# Patient Record
Sex: MALE | URBAN - METROPOLITAN AREA
[De-identification: names, ages, dates, MRNs, and addresses within clinical notes are randomized per-mention and may not be internally consistent; named-entity substitution may affect disease eponyms.]

---

## 2023-11-02 ENCOUNTER — TELEPHONE (OUTPATIENT)
Dept: HEMATOLOGY ONCOLOGY | Facility: CLINIC | Age: 63
End: 2023-11-02

## 2023-11-02 NOTE — TELEPHONE ENCOUNTER
Patient Call    Who are you speaking with? Spouse    If it is not the patient, are they listed on an active communication consent form? N/A   What is the reason for this call? Savanna Singleton the patient's wife, called to schedule a new patient appointment for the patient. I informed Savanna Singleton that I could schedule an appointment for the patient but the provider is going to want to see his results from pathology before he is seen. Savanna Singleton stated she will have the records sent over and hung up. I was not able to give her the proper fax because she was frustrated with the process and disconnected. I do not have her number, I did not try to give her a call back. Does this require a call back? No   If a call back is required, please list best call back number N/A   If a call back is required, advise that a message will be forwarded to their care team and someone will return their call as soon as possible. Did you relay this information to the patient?  No

## 2023-11-03 ENCOUNTER — DOCUMENTATION (OUTPATIENT)
Dept: HEMATOLOGY ONCOLOGY | Facility: CLINIC | Age: 63
End: 2023-11-03

## 2023-11-03 NOTE — PROGRESS NOTES
Intake received, chart reviewed for need of external records. Consulting: Dr. Zeenat Contreras  Scheduled on 11-  Dx:  Melanoma of left calf      Pathology requested:   From: Isidra Allen via fax [phone],              Routed to consulting providers team.

## 2023-11-08 ENCOUNTER — TELEPHONE (OUTPATIENT)
Dept: HEMATOLOGY ONCOLOGY | Facility: CLINIC | Age: 63
End: 2023-11-08

## 2023-11-08 NOTE — TELEPHONE ENCOUNTER
Appointment Change  Cancel, Reschedule, Change to Virtual      Who are you speaking with? Spouse   If it is not the patient, is the caller listed on the communication consent form? Yes   Which provider is the appointment scheduled with? Dr. Jovanna Patton   When was the original appointment scheduled? Please list date and time 11/27/23 Jovanna Patton   At which location is the appointment scheduled to take place? [facility]   Was the appointment rescheduled? Was the appointment changed from an in person visit to a virtual visit? If so, please list the details of the change. 11/13/23 Jovanna Patton   What is the reason for the appointment change? Sooner appt       Was STAR transport scheduled? N/A   Does STAR transport need to be scheduled for the new visit (if applicable) N/A   Does the patient need an infusion appointment rescheduled? N/A   Does the patient have an upcoming infusion appointment scheduled? If so, when? No   Is the patient undergoing chemotherapy? N/A   For appointments cancelled with less than 24 hours:  Was the no-show policy reviewed?  N/A

## 2023-11-09 PROBLEM — C43.72: Status: ACTIVE | Noted: 2023-11-09

## 2023-11-10 ENCOUNTER — TELEPHONE (OUTPATIENT)
Dept: HEMATOLOGY ONCOLOGY | Facility: CLINIC | Age: 63
End: 2023-11-10

## 2023-11-10 NOTE — TELEPHONE ENCOUNTER
Called PT for upcoming 11/13 appt with Dr. Lukasz Arredondo. PT is missing information. There was no answer LVM with hopeline number to call back to finish filling info, if not we will be happy to assist once he is her for his appt.

## 2023-11-10 NOTE — TELEPHONE ENCOUNTER
Patient Call    Who are you speaking with? Spouse    If it is not the patient, are they listed on an active communication consent form? N/A   What is the reason for this call? Israel Patricia is returning the missed call she had, she is requesting to speak to the office about what information is missing. Does this require a call back? Yes   If a call back is required, please list best call back number Israel Patricia :  [phone]    If a call back is required, advise that a message will be forwarded to their care team and someone will return their call as soon as possible. Did you relay this information to the patient?  Yes

## 2023-11-13 ENCOUNTER — CONSULT (OUTPATIENT)
Dept: SURGICAL ONCOLOGY | Facility: CLINIC | Age: 63
End: 2023-11-13
Payer: COMMERCIAL

## 2023-11-13 ENCOUNTER — PATIENT OUTREACH (OUTPATIENT)
Dept: HEMATOLOGY ONCOLOGY | Facility: CLINIC | Age: 63
End: 2023-11-13

## 2023-11-13 VITALS
WEIGHT: 212.8 LBS | SYSTOLIC BLOOD PRESSURE: 150 MMHG | OXYGEN SATURATION: 98 % | TEMPERATURE: 98.4 F | HEART RATE: 64 BPM | DIASTOLIC BLOOD PRESSURE: 70 MMHG

## 2023-11-13 DIAGNOSIS — C43.72 MELANOMA OF LEFT POSTERIOR CALF (HCC): Primary | ICD-10-CM

## 2023-11-13 PROCEDURE — 99204 OFFICE O/P NEW MOD 45 MIN: CPT | Performed by: STUDENT IN AN ORGANIZED HEALTH CARE EDUCATION/TRAINING PROGRAM

## 2023-11-13 RX ORDER — CYANOCOBALAMIN (VITAMIN B-12) 500 MCG
1 TABLET ORAL DAILY
COMMUNITY

## 2023-11-13 RX ORDER — DIPHENOXYLATE HYDROCHLORIDE AND ATROPINE SULFATE 2.5; .025 MG/1; MG/1
1 TABLET ORAL DAILY
COMMUNITY

## 2023-11-13 RX ORDER — CEFAZOLIN SODIUM 1 G/50ML
1000 SOLUTION INTRAVENOUS ONCE
OUTPATIENT
Start: 2023-11-13 | End: 2023-11-13

## 2023-11-13 RX ORDER — SILDENAFIL 100 MG/1
TABLET, FILM COATED ORAL
COMMUNITY
Start: 2023-10-21

## 2023-11-13 NOTE — PROGRESS NOTES
I called to check on slides request. [facility] had not sent slides out. They stated they had not received request. I resent request and confirmed receiptt. The pathology slides will be overnighted.

## 2023-11-13 NOTE — PROGRESS NOTES
Surgical Oncology Consultation    [address] [facility] [address]  [phone]    Patient:  Refugio Rivera  1960  [id number]    Primary Care provider:  No primary care provider on file. No primary provider on file. Referring provider:  Referral Self  No address on file    Diagnoses and all orders for this visit:    Melanoma of left posterior calf ([address])    Other orders  -     NON FORMULARY; Take 1 tablet by mouth daily  -     multivitamin (THERAGRAN) TABS; Take 1 tablet by mouth daily  -     sildenafil (VIAGRA) 100 mg tablet; TAKE ONE TABLET BY MOUTH ONCE A DAY AS NEEDED  -     GARLIC PO; Take 1 tablet by mouth daily  -     Omega-3 Fatty Acids (Fish Oil) 300 MG CAPS; Take 1 tablet by mouth daily        Chief Complaint   Patient presents with    Consult       No follow-ups on file. Oncology History   Melanoma of left posterior calf ([address])   10/19/2023 Biopsy    Left upper lateral calf- Malignant Melanoma  Thickness 0.5mm  Less than 1 Mitoses   Ulceration absent      11/9/2023 Initial Diagnosis    Melanoma of left posterior calf (HCC)         History of Present Illness  : This is a 43-year-old male who presents in consultation for a thin melanoma. Briefly, the patient sees dermatology regularly due to his history of having fair skin and red hair with numerous nevi. He has no history of melanoma or nonmelanoma to skin cancer. On his most recent visit with dermatology, a lesion was noted on the left posterior calf. The patient states he had not noticed any abnormality of this lesion or any changes over the past several months or years. A shave biopsy was obtained, revealing a T1 a melanoma with negative deep margin. Patient denies any other associated lumps or bumps of the left lower extremity. No lymphadenopathy. No systemic symptoms of bone pain, weight loss, headache, dizziness, other.     Review of Systems  Complete ROS Surg Onc:   Constitutional: The patient denies new or recent history of general fatigue, no recent weight loss, no change in appetite. Eyes: No complaints of visual problems, no scleral icterus. ENT: No complaints of ear pain, no hoarseness, no difficulty swallowing,  no tinnitus and no new masses in head, oral cavity, or neck. Cardiovascular: No complaints of chest pain, no palpitations, no ankle edema. Respiratory: No complaints of shortness of breath, no cough. Gastrointestinal: No complaints of jaundice, no bloody stools, no pale stools. Genitourinary: No complaints of dysuria, no hematuria, no nocturia, no frequent urination, no urethral discharge. Musculoskeletal: No complaints of weakness, paralysis, joint stiffness or arthralgias. Integumentary: No complaints of rash, no new lesions. Neurological: No complaints of convulsions, no seizures, no dizziness. Hematologic/Lymphatic: No complaints of easy bruising. Endocrine:  No hot or cold intolerance. No polydipsia, polyphagia, or polyuria. Allergy/immunology:  No environmental allergies. No food allergies. Not immunocompromised. Patient Active Problem List   Diagnosis    Melanoma of left posterior calf ([address])     No past medical history on file. No past surgical history on file. No family history on file.   Social History     Socioeconomic History    Marital status: /Civil Union     Spouse name: Not on file    Number of children: Not on file    Years of education: Not on file    Highest education level: Not on file   Occupational History    Not on file   Tobacco Use    Smoking status: Never    Smokeless tobacco: Never   Substance and Sexual Activity    Alcohol use: Not on file    Drug use: Not on file    Sexual activity: Not on file   Other Topics Concern    Not on file   Social History Narrative    Not on file     Social Determinants of Health     Financial Resource Strain: Not on file   Food Insecurity: Not on file   Transportation Needs: Not on file   Physical Activity: Not on file   Stress: Not on file   Social Connections: Not on file   Intimate Partner Violence: Not on file   Housing Stability: Not on file       Current Outpatient Medications:     GARLIC PO, Take 1 tablet by mouth daily, Disp: , Rfl:     multivitamin (THERAGRAN) TABS, Take 1 tablet by mouth daily, Disp: , Rfl:     NON FORMULARY, Take 1 tablet by mouth daily, Disp: , Rfl:     Omega-3 Fatty Acids (Fish Oil) 300 MG CAPS, Take 1 tablet by mouth daily, Disp: , Rfl:     sildenafil (VIAGRA) 100 mg tablet, TAKE ONE TABLET BY MOUTH ONCE A DAY AS NEEDED, Disp: , Rfl:   Allergies   Allergen Reactions    Penicillins Other (See Comments)     unknown    REACTION IS UNKNOWN - OCCURRED IN CHILDHOOD       Vitals:    11/13/23 1452   BP: 150/70   Pulse: 64   Temp: 98.4 °F (36.9 °C)   SpO2: 98%       Physical Exam   General: Appears well, appears stated age  Skin: Warm, anicteric. LFT posterior calf with biopsy site. HEENT: Normocephalic, atraumatic; sclera aniceteric, mucous membranes moist; cervical nodes without adenopathy  Cardiopulmonary: RRR, Easy WOB, no BLE edema  Abd: Flat and soft, nontender, no masses appreciated, no hepatosplenomegaly  MSK: Symmetric, no cyanosis, no overt weakness  Lymphatic: No cervical, axillary or inguinal lymphadenopathy  Neuro: Affect appropriate, no gross motor abnormalities      Pathology:  Per media    Labs: Reviewed in EPIC    Imaging  No results found. I independently reviewed and interpreted the above laboratory and imaging data. Discussion/Summary:   57 yo male with new diagnosis of T1a melanoma of left posterior calf. Discussed diagnosis of melanoma and explained that the patient has a thin melanoma pending our eval of path, for which a wide local excision is recommended. Discussed that any additional therapy will be guided by the final pathology.  Discussed that sun protection is best prevention for melanoma and discussed ongoing sun protection. Reinforced need for continued skin surveillance with dermatology, which the patient has been very diligent about. Risks, benefits, alternatives and prognosis discussed in full to include bleeding, infection, wound healing complications, need for re-excision, seroma, and pt expresses understanding and endorsement. Will need plastics closure with likely graft given location of the lesion. Will proceed with WLE.

## 2023-11-15 ENCOUNTER — DOCUMENTATION (OUTPATIENT)
Dept: HEMATOLOGY ONCOLOGY | Facility: CLINIC | Age: 63
End: 2023-11-15

## 2023-11-15 NOTE — PROGRESS NOTES
Confirmed outside  pathology slides received from [facility].     Note routed to consulting team  Dr. Bishop Rasmussen  Scheduled on 11-  Dx -Melanoma of left calf

## 2023-11-16 ENCOUNTER — LAB REQUISITION (OUTPATIENT)
Dept: LAB | Facility: HOSPITAL | Age: 63
End: 2023-11-16
Payer: COMMERCIAL

## 2023-11-16 DIAGNOSIS — L81.4 OTHER MELANIN HYPERPIGMENTATION: ICD-10-CM

## 2023-11-16 DIAGNOSIS — C43.72 MALIGNANT MELANOMA OF LEFT LOWER LIMB, INCLUDING HIP (HCC): ICD-10-CM

## 2023-11-16 PROCEDURE — 88321 CONSLTJ&REPRT SLD PREP ELSWR: CPT | Performed by: STUDENT IN AN ORGANIZED HEALTH CARE EDUCATION/TRAINING PROGRAM

## 2023-12-01 ENCOUNTER — TELEPHONE (OUTPATIENT)
Dept: HEMATOLOGY ONCOLOGY | Facility: CLINIC | Age: 63
End: 2023-12-01

## 2023-12-01 NOTE — TELEPHONE ENCOUNTER
Patient Call    Who are you speaking with? Patient    If it is not the patient, are they listed on an active communication consent form? N/A   What is the reason for this call? Patient's wife is asking after the pre op work he needs to do. She states the paperwork says 2 weeks prior to surgery, but he can't not get it done until 12/4/23   Does this require a call back? Yes   If a call back is required, please list best call back number [phone]   If a call back is required, advise that a message will be forwarded to their care team and someone will return their call as soon as possible. Did you relay this information to the patient?  Yes

## 2023-12-01 NOTE — TELEPHONE ENCOUNTER
Spoke with pts wife. Discussed that as long as he is able to get his PATs done on that day, it will be fine. She verbalized understanding and was appreciative of the call.

## 2023-12-04 ENCOUNTER — HOSPITAL ENCOUNTER (OUTPATIENT)
Dept: RADIOLOGY | Facility: HOSPITAL | Age: 63
Discharge: HOME | End: 2023-12-04
Payer: COMMERCIAL

## 2023-12-04 ENCOUNTER — CONSULT (OUTPATIENT)
Dept: PLASTIC SURGERY | Facility: CLINIC | Age: 63
End: 2023-12-04
Payer: COMMERCIAL

## 2023-12-04 ENCOUNTER — APPOINTMENT (OUTPATIENT)
Dept: LAB | Facility: HOSPITAL | Age: 63
End: 2023-12-04
Payer: COMMERCIAL

## 2023-12-04 ENCOUNTER — TELEPHONE (OUTPATIENT)
Dept: PLASTIC SURGERY | Facility: CLINIC | Age: 63
End: 2023-12-04

## 2023-12-04 ENCOUNTER — OFFICE VISIT (OUTPATIENT)
Dept: LAB | Facility: HOSPITAL | Age: 63
End: 2023-12-04
Payer: COMMERCIAL

## 2023-12-04 VITALS
HEIGHT: 71 IN | DIASTOLIC BLOOD PRESSURE: 74 MMHG | HEART RATE: 79 BPM | SYSTOLIC BLOOD PRESSURE: 133 MMHG | BODY MASS INDEX: 30.1 KG/M2 | WEIGHT: 215 LBS | TEMPERATURE: 97.3 F

## 2023-12-04 DIAGNOSIS — C43.72 MELANOMA OF LEFT POSTERIOR CALF (HCC): ICD-10-CM

## 2023-12-04 LAB
ALBUMIN SERPL BCP-MCNC: 4.2 G/DL (ref 3.5–5)
ALP SERPL-CCNC: 80 U/L (ref 34–104)
ALT SERPL W P-5'-P-CCNC: 38 U/L (ref 7–52)
ANION GAP SERPL CALCULATED.3IONS-SCNC: 5 MMOL/L
AST SERPL W P-5'-P-CCNC: 53 U/L (ref 13–39)
BASOPHILS # BLD AUTO: 0.04 THOUSANDS/ÂΜL (ref 0–0.1)
BASOPHILS NFR BLD AUTO: 1 % (ref 0–1)
BILIRUB SERPL-MCNC: 0.46 MG/DL (ref 0.2–1)
BUN SERPL-MCNC: 21 MG/DL (ref 5–25)
CALCIUM SERPL-MCNC: 9.5 MG/DL (ref 8.4–10.2)
CHLORIDE SERPL-SCNC: 102 MMOL/L (ref 96–108)
CO2 SERPL-SCNC: 32 MMOL/L (ref 21–32)
CREAT SERPL-MCNC: 1.02 MG/DL (ref 0.6–1.3)
EOSINOPHIL # BLD AUTO: 0.36 THOUSAND/ÂΜL (ref 0–0.61)
EOSINOPHIL NFR BLD AUTO: 5 % (ref 0–6)
ERYTHROCYTE [DISTWIDTH] IN BLOOD BY AUTOMATED COUNT: 12.1 % (ref 11.6–15.1)
GFR SERPL CREATININE-BSD FRML MDRD: 77 ML/MIN/1.73SQ M
GLUCOSE SERPL-MCNC: 90 MG/DL (ref 65–140)
HCT VFR BLD AUTO: 41.7 % (ref 36.5–49.3)
HGB BLD-MCNC: 13.9 G/DL (ref 12–17)
IMM GRANULOCYTES # BLD AUTO: 0.02 THOUSAND/UL (ref 0–0.2)
IMM GRANULOCYTES NFR BLD AUTO: 0 % (ref 0–2)
LYMPHOCYTES # BLD AUTO: 2.29 THOUSANDS/ÂΜL (ref 0.6–4.47)
LYMPHOCYTES NFR BLD AUTO: 31 % (ref 14–44)
MCH RBC QN AUTO: 31.4 PG (ref 26.8–34.3)
MCHC RBC AUTO-ENTMCNC: 33.3 G/DL (ref 31.4–37.4)
MCV RBC AUTO: 94 FL (ref 82–98)
MONOCYTES # BLD AUTO: 0.8 THOUSAND/ÂΜL (ref 0.17–1.22)
MONOCYTES NFR BLD AUTO: 11 % (ref 4–12)
NEUTROPHILS # BLD AUTO: 3.88 THOUSANDS/ÂΜL (ref 1.85–7.62)
NEUTS SEG NFR BLD AUTO: 52 % (ref 43–75)
NRBC BLD AUTO-RTO: 0 /100 WBCS
PLATELET # BLD AUTO: 265 THOUSANDS/UL (ref 149–390)
PMV BLD AUTO: 9.3 FL (ref 8.9–12.7)
POTASSIUM SERPL-SCNC: 4 MMOL/L (ref 3.5–5.3)
PROT SERPL-MCNC: 7.5 G/DL (ref 6.4–8.4)
RBC # BLD AUTO: 4.42 MILLION/UL (ref 3.88–5.62)
SODIUM SERPL-SCNC: 139 MMOL/L (ref 135–147)
WBC # BLD AUTO: 7.39 THOUSAND/UL (ref 4.31–10.16)

## 2023-12-04 PROCEDURE — 93005 ELECTROCARDIOGRAM TRACING: CPT

## 2023-12-04 PROCEDURE — 99203 OFFICE O/P NEW LOW 30 MIN: CPT | Performed by: STUDENT IN AN ORGANIZED HEALTH CARE EDUCATION/TRAINING PROGRAM

## 2023-12-04 PROCEDURE — 36415 COLL VENOUS BLD VENIPUNCTURE: CPT

## 2023-12-04 PROCEDURE — 71046 X-RAY EXAM CHEST 2 VIEWS: CPT

## 2023-12-04 PROCEDURE — 85025 COMPLETE CBC W/AUTO DIFF WBC: CPT

## 2023-12-04 PROCEDURE — 80053 COMPREHEN METABOLIC PANEL: CPT

## 2023-12-04 NOTE — H&P (VIEW-ONLY)
Plastic Surgery Consult    Reason for visit: biopsy proven melanoma of the left posterior calf, presents for reconstructive options    HPI form 12/4/23  Patient is a 57 y/o male who presents with biopsy proven melanoma of the left posterior calf. He is scheduled for wide-local excision of the lesion with Dr Stanton Mcarthur. He presents for discussion of reconstructive options. This is patient's first melanoma. He has never had any skin cancer in the past.    ROS: 12 pt ROS negative, except as otherwise noted in HPI    PMH: None  FamHx: non-contrib  SurgHx: left knee replacement, left inguinal hernia replacement  SocHx: no tobacco, no ETOH  Meds: no steroids, no blood thinners  Allergies: PCN    PE:    Vitals:    12/04/23 1130   BP: 133/74   Pulse: 79   Temp: (!) 97.3 °F (36.3 °C)       General: NC/AT, breathing comfortably on RA  Neuro: CN II-XII grossly intact, symmetric reflexes  HEENT: PERRLA, EOMI, external ears normal, no lesions or deformities, neck supple, trachea midline  Respiratory: CTAB, normal respiratory effort  Cardio: RRR, normal S1, S2, no murmur, rubs, gallops  GI: soft, non-tender, non-distended  Extremities/MSK: normal alignment, mobility, gait, no edema  Skin: no rashes, lesions, subcutaneous nodules    BMI 30    Left posterior calf with mildly erythematous amorphous nevus ~2.5x1.5 cm    Path: reviewed. A/P: 57 y/o male with left posterior calf melanoma scheduled for WLE with Dr Stanton Mcarthur.  -Discussed reconstructive options including complex closure, local flap, possible STSG with VAC placement. Discussed each of these options in detail, will most likely be STSG with VAC placement bolster due to size, but ultimate technique will depend on size of wound.  -Discussed left thigh as donor site  -Discussed risks, procedure, complications, including but not limited to infection, bleeding, scarring, need for further surgery, contour deformity, partial vs complete graft loss.  Patient acknowledged.  -All questions answered, concerns addressed. -consent obtained, will coordinate with Dr Nicolas Maxwell  -Spent 40 minutes in consultation with patient.  Greater than 50% of the total time was spent obtaining history, evaluation, performing exam, discussion of management options including post-operative care, answering patient's questions and concerns, chart reviewing, and documentation      Jeanna Samano MD   [address] [place] [address] and Reconstructive Surgery   [facility], [address] [place] [address] Stephanie Redd   Office: [phone]

## 2023-12-04 NOTE — PROGRESS NOTES
Plastic Surgery Consult    Reason for visit: biopsy proven melanoma of the left posterior calf, presents for reconstructive options    HPI form 12/4/23  Patient is a 57 y/o male who presents with biopsy proven melanoma of the left posterior calf. He is scheduled for wide-local excision of the lesion with Dr Vin Taylor. He presents for discussion of reconstructive options. This is patient's first melanoma. He has never had any skin cancer in the past.    ROS: 12 pt ROS negative, except as otherwise noted in HPI    PMH: None  FamHx: non-contrib  SurgHx: left knee replacement, left inguinal hernia replacement  SocHx: no tobacco, no ETOH  Meds: no steroids, no blood thinners  Allergies: PCN    PE:    Vitals:    12/04/23 1130   BP: 133/74   Pulse: 79   Temp: (!) 97.3 °F (36.3 °C)       General: NC/AT, breathing comfortably on RA  Neuro: CN II-XII grossly intact, symmetric reflexes  HEENT: PERRLA, EOMI, external ears normal, no lesions or deformities, neck supple, trachea midline  Respiratory: CTAB, normal respiratory effort  Cardio: RRR, normal S1, S2, no murmur, rubs, gallops  GI: soft, non-tender, non-distended  Extremities/MSK: normal alignment, mobility, gait, no edema  Skin: no rashes, lesions, subcutaneous nodules    BMI 30    Left posterior calf with mildly erythematous amorphous nevus ~2.5x1.5 cm    Path: reviewed. A/P: 57 y/o male with left posterior calf melanoma scheduled for WLE with Dr Vin Taylor.  -Discussed reconstructive options including complex closure, local flap, possible STSG with VAC placement. Discussed each of these options in detail, will most likely be STSG with VAC placement bolster due to size, but ultimate technique will depend on size of wound.  -Discussed left thigh as donor site  -Discussed risks, procedure, complications, including but not limited to infection, bleeding, scarring, need for further surgery, contour deformity, partial vs complete graft loss.  Patient acknowledged.  -All questions answered, concerns addressed. -consent obtained, will coordinate with Dr Violette Amado  -Spent 40 minutes in consultation with patient.  Greater than 50% of the total time was spent obtaining history, evaluation, performing exam, discussion of management options including post-operative care, answering patient's questions and concerns, chart reviewing, and documentation      Nav Vuong MD   [address] [facility] [address] and Reconstructive Surgery   [facility], [facility] [address] [place] Stephanie Redd   Office: [phone]

## 2023-12-06 ENCOUNTER — ANESTHESIA EVENT (OUTPATIENT)
Dept: PERIOP | Facility: HOSPITAL | Age: 63
End: 2023-12-06
Payer: COMMERCIAL

## 2023-12-06 ENCOUNTER — TELEPHONE (OUTPATIENT)
Age: 63
End: 2023-12-06

## 2023-12-06 LAB
ATRIAL RATE: 66 BPM
P AXIS: 31 DEGREES
PR INTERVAL: 148 MS
QRS AXIS: 69 DEGREES
QRSD INTERVAL: 86 MS
QT INTERVAL: 402 MS
QTC INTERVAL: 421 MS
T WAVE AXIS: 57 DEGREES
VENTRICULAR RATE: 66 BPM

## 2023-12-06 NOTE — TELEPHONE ENCOUNTER
Wife called to get fax number to office to send short term disability forms filled out. Patient will have surgery next week.     Office fax was given [phone]

## 2023-12-06 NOTE — PRE-PROCEDURE INSTRUCTIONS
Pre-Surgery InstrMedication instructions for day surgery reviewed. Please use only a sip of water to take your instructed medications. Avoid all over the counter vitamins, supplements and NSAIDS for one week prior to surgery per anesthesia guidelines. Tylenol is ok to take as needed. You will receive a call one business day prior to surgery with an arrival time and hospital directions. If your surgery is scheduled on a Monday, the hospital will be calling you on the Friday prior to your surgery. If you have not heard from anyone by 8pm, please call the hospital supervisor through the hospital  at [phone]. Travis Martinez [phone]). Do not eat or drink anything after midnight the night before your surgery, including candy, mints, lifesavers, or chewing gum. Do not drink alcohol 24hrs before your surgery. Try not to smoke at least 24hrs before your surgery. Follow the pre surgery showering instructions as listed in the [facility] Surgical Experience Booklet” or otherwise provided by your surgeon's office. Do not use a blade to shave the surgical area 1 week before surgery. It is okay to use a clean electric clippers up to 24 hours before surgery. Do not apply any lotions, creams, including makeup, cologne, deodorant, or perfumes after showering on the day of your surgery. Do not use dry shampoo, hair spray, hair gel, or any type of hair products. No contact lenses, eye make-up, or artificial eyelashes. Remove nail polish, including gel polish, and any artificial, gel, or acrylic nails if possible. Remove all jewelry including rings and body piercing jewelry. Wear causal clothing that is easy to take on and off. Consider your type of surgery. Keep any valuables, jewelry, piercings at home. Please bring any specially ordered equipment (sling, braces) if indicated. Arrange for a responsible person to drive you to and from the hospital on the day of your surgery.  Visitor Guidelines discussed. Call the surgeon's office with any new illnesses, exposures, or additional questions prior to surgery. Please reference your [facility] Surgical Experience Booklet” for additional information to prepare for your upcoming surgery. uctions:   Medication Instructions    Ascorbic Acid (VITAMIN C PO) Stop taking 7 days prior to surgery. GARLIC PO Stop taking 7 days prior to surgery. multivitamin (THERAGRAN) TABS Stop taking 7 days prior to surgery. NON FORMULARY Stop taking 7 days prior to surgery. Omega-3 Fatty Acids (SALMON OIL PO) Stop taking 7 days prior to surgery. sildenafil (VIAGRA) 100 mg tablet Hold day of surgery.

## 2023-12-07 NOTE — TELEPHONE ENCOUNTER
Patient called again to see if we have his paperwork , she just faxed over 2 minutes ago .  I advised her to call tomorrow to confirm that we received it

## 2023-12-08 ENCOUNTER — TELEPHONE (OUTPATIENT)
Dept: HEMATOLOGY ONCOLOGY | Facility: CLINIC | Age: 63
End: 2023-12-08

## 2023-12-08 NOTE — TELEPHONE ENCOUNTER
Patient Call    Who are you speaking with? Spouse    If it is not the patient, are they listed on an active communication consent form? Yes   What is the reason for this call? Patients wife is wondering if we received the fax for short term disability paperwork   Does this require a call back? Yes   If a call back is required, please list best call back number [phone]   If a call back is required, advise that a message will be forwarded to their care team and someone will return their call as soon as possible. Did you relay this information to the patient?  Yes

## 2023-12-08 NOTE — TELEPHONE ENCOUNTER
Patient's wife called asking if we received patient's fmla form . She faxed over last night . I informed her that I do not see anything under media and she needs to resend .

## 2023-12-11 NOTE — TELEPHONE ENCOUNTER
Spoke with pts wife and informed her fax was received for pts STD. Will complete forms and have Dr Monet Avery sign off on them.

## 2023-12-12 ENCOUNTER — TELEPHONE (OUTPATIENT)
Dept: HEMATOLOGY ONCOLOGY | Facility: CLINIC | Age: 63
End: 2023-12-12

## 2023-12-12 NOTE — TELEPHONE ENCOUNTER
Patient Call    Who are you speaking with? Spouse    If it is not the patient, are they listed on an active communication consent form? Yes   What is the reason for this call? Jen De Anda calling in regards to patient's surgery with Dr. Nicolette Su scheduled for tomorrow. Jen De Anda states they did not receive a call for when patient should arrive at the hospital.  Jen De Anda would like a call back in regards to patient's surgery. Does this require a call back? Yes   If a call back is required, please list best call back number [phone]   If a call back is required, advise that a message will be forwarded to their care team and someone will return their call as soon as possible. Did you relay this information to the patient?  Yes

## 2023-12-12 NOTE — TELEPHONE ENCOUNTER
Spoke with wife and let her know they will receive the call sometime between now and 6pm with an arrival time for surgery tomorrow.

## 2023-12-13 ENCOUNTER — HOSPITAL ENCOUNTER (OUTPATIENT)
Facility: HOSPITAL | Age: 63
Setting detail: OUTPATIENT SURGERY
Discharge: HOME | End: 2023-12-13
Attending: STUDENT IN AN ORGANIZED HEALTH CARE EDUCATION/TRAINING PROGRAM | Admitting: STUDENT IN AN ORGANIZED HEALTH CARE EDUCATION/TRAINING PROGRAM
Payer: COMMERCIAL

## 2023-12-13 ENCOUNTER — ANESTHESIA (OUTPATIENT)
Dept: PERIOP | Facility: HOSPITAL | Age: 63
End: 2023-12-13
Payer: COMMERCIAL

## 2023-12-13 VITALS
WEIGHT: 204.6 LBS | HEIGHT: 71 IN | RESPIRATION RATE: 22 BRPM | TEMPERATURE: 98.3 F | HEART RATE: 66 BPM | OXYGEN SATURATION: 96 % | SYSTOLIC BLOOD PRESSURE: 161 MMHG | BODY MASS INDEX: 28.64 KG/M2 | DIASTOLIC BLOOD PRESSURE: 90 MMHG

## 2023-12-13 DIAGNOSIS — C43.72 MELANOMA OF LEFT POSTERIOR CALF (HCC): Primary | ICD-10-CM

## 2023-12-13 PROCEDURE — 14302 TIS TRNFR ADDL 30 SQ CM: CPT | Performed by: STUDENT IN AN ORGANIZED HEALTH CARE EDUCATION/TRAINING PROGRAM

## 2023-12-13 PROCEDURE — 11603 EXC TR-EXT MAL+MARG 2.1-3 CM: CPT | Performed by: STUDENT IN AN ORGANIZED HEALTH CARE EDUCATION/TRAINING PROGRAM

## 2023-12-13 PROCEDURE — 88305 TISSUE EXAM BY PATHOLOGIST: CPT | Performed by: STUDENT IN AN ORGANIZED HEALTH CARE EDUCATION/TRAINING PROGRAM

## 2023-12-13 PROCEDURE — 14301 TIS TRNFR ANY 30.1-60 SQ CM: CPT | Performed by: STUDENT IN AN ORGANIZED HEALTH CARE EDUCATION/TRAINING PROGRAM

## 2023-12-13 PROCEDURE — 11606 EXC TR-EXT MAL+MARG >4 CM: CPT | Performed by: PHYSICIAN ASSISTANT

## 2023-12-13 RX ORDER — PROPOFOL 10 MG/ML
INJECTION, EMULSION INTRAVENOUS AS NEEDED
Status: DISCONTINUED | OUTPATIENT
Start: 2023-12-13 | End: 2023-12-13

## 2023-12-13 RX ORDER — GABAPENTIN 300 MG/1
300 CAPSULE ORAL ONCE
Status: COMPLETED | OUTPATIENT
Start: 2023-12-13 | End: 2023-12-13

## 2023-12-13 RX ORDER — OXYCODONE HYDROCHLORIDE 5 MG/1
5 TABLET ORAL EVERY 4 HOURS PRN
Status: DISCONTINUED | OUTPATIENT
Start: 2023-12-13 | End: 2023-12-13 | Stop reason: HOSPADM

## 2023-12-13 RX ORDER — ACETAMINOPHEN AND CODEINE PHOSPHATE 300; 30 MG/1; MG/1
1 TABLET ORAL EVERY 4 HOURS PRN
Qty: 18 TABLET | Refills: 0 | Status: SHIPPED | OUTPATIENT
Start: 2023-12-13

## 2023-12-13 RX ORDER — FENTANYL CITRATE/PF 50 MCG/ML
25 SYRINGE (ML) INJECTION
Status: DISCONTINUED | OUTPATIENT
Start: 2023-12-13 | End: 2023-12-13 | Stop reason: HOSPADM

## 2023-12-13 RX ORDER — KETOROLAC TROMETHAMINE 30 MG/ML
INJECTION, SOLUTION INTRAMUSCULAR; INTRAVENOUS AS NEEDED
Status: DISCONTINUED | OUTPATIENT
Start: 2023-12-13 | End: 2023-12-13

## 2023-12-13 RX ORDER — FENTANYL CITRATE 50 UG/ML
INJECTION, SOLUTION INTRAMUSCULAR; INTRAVENOUS AS NEEDED
Status: DISCONTINUED | OUTPATIENT
Start: 2023-12-13 | End: 2023-12-13

## 2023-12-13 RX ORDER — ONDANSETRON 2 MG/ML
INJECTION INTRAMUSCULAR; INTRAVENOUS AS NEEDED
Status: DISCONTINUED | OUTPATIENT
Start: 2023-12-13 | End: 2023-12-13

## 2023-12-13 RX ORDER — HYDROMORPHONE HCL/PF 1 MG/ML
0.25 SYRINGE (ML) INJECTION
Status: DISCONTINUED | OUTPATIENT
Start: 2023-12-13 | End: 2023-12-13 | Stop reason: HOSPADM

## 2023-12-13 RX ORDER — LIDOCAINE HYDROCHLORIDE 20 MG/ML
INJECTION, SOLUTION EPIDURAL; INFILTRATION; INTRACAUDAL; PERINEURAL AS NEEDED
Status: DISCONTINUED | OUTPATIENT
Start: 2023-12-13 | End: 2023-12-13

## 2023-12-13 RX ORDER — ONDANSETRON 2 MG/ML
4 INJECTION INTRAMUSCULAR; INTRAVENOUS ONCE AS NEEDED
Status: DISCONTINUED | OUTPATIENT
Start: 2023-12-13 | End: 2023-12-13 | Stop reason: HOSPADM

## 2023-12-13 RX ORDER — DEXAMETHASONE SODIUM PHOSPHATE 10 MG/ML
INJECTION, SOLUTION INTRAMUSCULAR; INTRAVENOUS AS NEEDED
Status: DISCONTINUED | OUTPATIENT
Start: 2023-12-13 | End: 2023-12-13

## 2023-12-13 RX ORDER — ACETAMINOPHEN 325 MG/1
975 TABLET ORAL ONCE
Status: COMPLETED | OUTPATIENT
Start: 2023-12-13 | End: 2023-12-13

## 2023-12-13 RX ORDER — SODIUM CHLORIDE, SODIUM LACTATE, POTASSIUM CHLORIDE, CALCIUM CHLORIDE 600; 310; 30; 20 MG/100ML; MG/100ML; MG/100ML; MG/100ML
INJECTION, SOLUTION INTRAVENOUS CONTINUOUS PRN
Status: DISCONTINUED | OUTPATIENT
Start: 2023-12-13 | End: 2023-12-13

## 2023-12-13 RX ORDER — MIDAZOLAM HYDROCHLORIDE 2 MG/2ML
INJECTION, SOLUTION INTRAMUSCULAR; INTRAVENOUS AS NEEDED
Status: DISCONTINUED | OUTPATIENT
Start: 2023-12-13 | End: 2023-12-13

## 2023-12-13 RX ORDER — CEFAZOLIN SODIUM 1 G/50ML
2000 SOLUTION INTRAVENOUS ONCE
Status: COMPLETED | OUTPATIENT
Start: 2023-12-13 | End: 2023-12-13

## 2023-12-13 RX ORDER — SODIUM CHLORIDE, SODIUM LACTATE, POTASSIUM CHLORIDE, CALCIUM CHLORIDE 600; 310; 30; 20 MG/100ML; MG/100ML; MG/100ML; MG/100ML
125 INJECTION, SOLUTION INTRAVENOUS CONTINUOUS
Status: DISCONTINUED | OUTPATIENT
Start: 2023-12-13 | End: 2023-12-13 | Stop reason: HOSPADM

## 2023-12-13 RX ORDER — LIDOCAINE HYDROCHLORIDE AND EPINEPHRINE 10; 10 MG/ML; UG/ML
INJECTION, SOLUTION INFILTRATION; PERINEURAL AS NEEDED
Status: DISCONTINUED | OUTPATIENT
Start: 2023-12-13 | End: 2023-12-13 | Stop reason: HOSPADM

## 2023-12-13 RX ADMIN — FENTANYL CITRATE 50 MCG: 50 INJECTION, SOLUTION INTRAMUSCULAR; INTRAVENOUS at 14:56

## 2023-12-13 RX ADMIN — ONDANSETRON 4 MG: 2 INJECTION INTRAMUSCULAR; INTRAVENOUS at 14:34

## 2023-12-13 RX ADMIN — CEFAZOLIN SODIUM 2000 MG: 1 SOLUTION INTRAVENOUS at 14:41

## 2023-12-13 RX ADMIN — SODIUM CHLORIDE, SODIUM LACTATE, POTASSIUM CHLORIDE, AND CALCIUM CHLORIDE: .6; .31; .03; .02 INJECTION, SOLUTION INTRAVENOUS at 14:29

## 2023-12-13 RX ADMIN — ACETAMINOPHEN 975 MG: 325 TABLET, FILM COATED ORAL at 12:45

## 2023-12-13 RX ADMIN — DEXAMETHASONE SODIUM PHOSPHATE 10 MG: 10 INJECTION, SOLUTION INTRAMUSCULAR; INTRAVENOUS at 14:34

## 2023-12-13 RX ADMIN — KETOROLAC TROMETHAMINE 15 MG: 30 INJECTION, SOLUTION INTRAMUSCULAR; INTRAVENOUS at 15:18

## 2023-12-13 RX ADMIN — PROPOFOL 200 MG: 10 INJECTION, EMULSION INTRAVENOUS at 14:34

## 2023-12-13 RX ADMIN — LIDOCAINE HYDROCHLORIDE 100 MG: 20 INJECTION, SOLUTION EPIDURAL; INFILTRATION; INTRACAUDAL; PERINEURAL at 14:34

## 2023-12-13 RX ADMIN — MIDAZOLAM 2 MG: 1 INJECTION INTRAMUSCULAR; INTRAVENOUS at 14:27

## 2023-12-13 RX ADMIN — FENTANYL CITRATE 50 MCG: 50 INJECTION, SOLUTION INTRAMUSCULAR; INTRAVENOUS at 14:47

## 2023-12-13 RX ADMIN — GABAPENTIN 300 MG: 300 CAPSULE ORAL at 12:45

## 2023-12-13 NOTE — OP NOTE
OPERATIVE REPORT  PATIENT NAME: Mani Barnes    :  1960  MRN: [id number]  Pt Location: [facility] OR ROOM 01    SURGERY DATE: 2023    Surgeons and Role:  Panel 1:     * Emil Mccauley MD - Primary     * Jazmin Sylvester - Assisting  Panel 2:     * Jin Penaloza MD - Primary    Preop Diagnosis:  Melanoma of left posterior calf ([address]) [C43.72]    Post-Op Diagnosis Codes:     * Melanoma of left posterior calf ([address]) [C43.72]    Procedure(s):  Local advancement flaps, adjacent tissue transfer for closure of left lower posterior calf defect (12x8 cm, 96 cm2)    Specimen(s):  ID Type Source Tests Collected by Time Destination   1 : Melanoma left calf -- suture marks 1200 Tissue Leg, Left TISSUE EXAM Emil Mccauley MD 2023  2:53 PM        Estimated Blood Loss:   Minimal    Drains:  * No LDAs found *    Anesthesia Type:   General    Operative Indications:  Melanoma of left posterior calf ([address]) [C43.72]    Operative Findings:  Left posterior calf defect (4x3 cm)  Bilateral local advancement flaps (12x8 cm, total 96 cm2)    Complications:   None    Procedure and Technique:  Patient was already intubated in the operating room as Dr Stanton Mcarthur had performed wide local excision of left calf melanoma. A timeout was performed for my portion of the case at which point all patient identifiers were deemed to be correct. The left lower extremity was prepped and draped in the normal sterile fashion. I first began by examining the defect which was 4x3 cm in greatest dimension down to the muscular fascia. There was moderate laxity in the surrounding tissue and so I decided to perform bilateral advancement flaps rather than skin graft. The bilateral advancement flaps were marked including the standing cutaneous deformities. The standing cutaneous deformities were excised. The bilateral advancement flaps were dissected and raised until they could be advanced to closure on minimal tension.  After adequate dissection was performed, the bilateral advancement flaps were reapproximated and dermis and subcutaneous tissue was closed with 2-0 vicryl and skin with 3-0 stratafix, followed by exofin skin glue. Kerlex and acewrap for compression was performed on the wound. This concluded the procedure. Patient tolerated the procedure well without complications. At the end of the case, all sponge, needle, and instrument counts were correct. Patient was awakened from anesthesia and taken to the PACU in stable condition.     I was present for the entire procedure, A qualified resident physician was not available and A physician assistant was not required during the procedure for retraction, tissue handling, dissection and suturing        Patient Disposition:  PACU     SIGNATURE: Jacky Portillo MD  DATE: December 13, 2023  TIME: 3:27 PM

## 2023-12-13 NOTE — ANESTHESIA PREPROCEDURE EVALUATION
Procedure:  WIDE LOCAL EXCISION OF LEFT CALF MELANOMA (Left: Leg)  RECONSTRUCTION OF LEFT LOWER EXTREMITY WOUND WITH SPLIT THICKNESS SKIN GRAFT WITH WOUND VAC BOLSTER. (Left: Leg Lower)    Relevant Problems   ANESTHESIA (within normal limits)      CARDIO (within normal limits)      GI/HEPATIC   (-) Gastroesophageal reflux disease      PULMONARY (within normal limits)  Loud snoring no known JATIN   (-) Smoking   (-) URI (upper respiratory infection)      Other   (+) Melanoma of left posterior calf (HCC)        Physical Exam    Airway    Mallampati score: II  TM Distance: >3 FB  Neck ROM: full     Dental   No notable dental hx     Cardiovascular      Pulmonary      Other Findings        Anesthesia Plan  ASA Score- 1     Anesthesia Type- general with ASA Monitors. Additional Monitors:     Airway Plan: LMA. Plan Factors-Exercise tolerance (METS): >4 METS. Chart reviewed. Existing labs reviewed. Patient summary reviewed. Patient is not a current smoker. Induction- intravenous. Postoperative Plan-     Informed Consent- Anesthetic plan and risks discussed with patient, spouse and daughter. I personally reviewed this patient with the CRNA. Discussed and agreed on the Anesthesia Plan with the CRNA. Darylene Pipe

## 2023-12-13 NOTE — OP NOTE
OPERATIVE REPORT  PATIENT NAME: Honorio Longo    :  1960  MRN: [id number]  Pt Location: [facility] OR ROOM 01    SURGERY DATE: 2023    Surgeons and Role:  Panel 1:     * Zach Morataya MD - Primary     * Jacinta Sharma MD - Co-surgeon  Panel 2:     * Jacinta Sharma MD - Primary    Preop Diagnosis:  Melanoma of left posterior calf ([address]) [C43.72]    Post-Op Diagnosis Codes:     * Melanoma of left posterior calf ([address]) [C43.72]    Procedure(s):  Left - WIDE LOCAL EXCISION OF LEFT CALF MELANOMA  Left - RECONSTRUCTION OF LEFT LOWER EXTREMITY WOUND WITH SPLIT THICKNESS SKIN GRAFT WITH WOUND VAC BOLSTER. Specimen(s):  ID Type Source Tests Collected by Time Destination   1 : Melanoma left calf Tissue Leg, Left TISSUE EXAM Zach Morataya MD 2023 1453        Estimated Blood Loss:   Minimal    Drains:  * No LDAs found *    Anesthesia Type:   General    Operative Indications:  Melanoma of left posterior calf ([address]) [C43.72]      Operative Findings:  Uneventful WLE    Complications:   None    Procedure and Technique:  The patient was met in preop. The site of melanoma was marked at left posterior calf. The patient was then transported to preop and finally to the operating room. The patient was identified and general endotracheal intubation was achieved. The patient was positioned laterally and we turned our attention to the primary melanoma of the left posterior calf. The margins of the melanoma were marked at 0.9 x 1.2 cm. A circumferential margin of 1 cm was marked. The skin was incised sharply to the muscular fascia. The melanoma was then dissected off of the fascia with electrocautery. The specimen was marked with a stitch at 12 o clock. Dr Mary Catherine assumed care of the patient for closure. I was present for all critical portions of the procedure. and A physician assistant was required during the procedure for retraction, tissue handling, dissection and suturing.     Patient Disposition:  intubated and hemodynamically stable.     Wide Local Excision for Primary Cutaneous Melanoma - Excision 1 (Lower Leg - Left)  Operation performed with curative intent Yes   Original Breslow thickness of the lesion 0.5 mm (to the tenth of a millimeter)   Clinical margin width   (measured from the edge of the lesion or the prior excision scar) 1 cm   Depth of excision Full-thickness skin/subcutaneous tissue down to fascia (melanoma)         SIGNATURE: Ronen De Leon MD  DATE: December 13, 2023  TIME: 2:57 PM

## 2023-12-13 NOTE — INTERVAL H&P NOTE
H&P reviewed. After examining the patient I find no changes in the patients condition since the H&P had been written.     Vitals:    12/13/23 1242   BP: 159/79   Pulse: 71   Resp: 20   Temp: 98.1 °F (36.7 °C)   SpO2: 96%

## 2023-12-13 NOTE — ANESTHESIA POSTPROCEDURE EVALUATION
Post-Op Assessment Note    CV Status:  Stable  Pain Score: 0    Pain management: adequate       Mental Status:  Alert and sleepy   Hydration Status:  Euvolemic   PONV Controlled:  Controlled   Airway Patency:  Patent     Post Op Vitals Reviewed: Yes      Staff: CRNA               BP   144/72   Temp   98   Pulse  71   Resp   18   SpO2   99

## 2023-12-13 NOTE — INTERIM OP NOTE
WIDE LOCAL EXCISION OF LEFT CALF MELANOMA  Postoperative Note  PATIENT NAME: Lubna Barillas  : 1960  MRN: [id number]  [facility] OR ROOM 01    Surgery Date: 2023    Preop Diagnosis:  Melanoma of left posterior calf ([address]) [C43.72]    Post-Op Diagnosis Codes:     * Melanoma of left posterior calf ([address]) [C43.72]    Procedure(s) (LRB):  WIDE LOCAL EXCISION OF LEFT CALF MELANOMA (Left)  RECONSTRUCTION OF LEFT LOWER EXTREMITY WOUND WITH SPLIT THICKNESS SKIN GRAFT WITH WOUND VAC BOLSTER.  (Left)    Surgeons and Role:  Panel 1:     * Jesusita Chacon MD - Primary  Panel 2:     * Shant Chang MD - Primary    Specimens:  ID Type Source Tests Collected by Time Destination   1 : Melanoma left calf Tissue Leg, Left TISSUE EXAM Jesusita Chacon MD 2023 1453        Estimated Blood Loss:   Minimal    Anesthesia Type:   General     Findings:    none  Complications:   None      SIGNATURE: Oamr Maguire PA-C   DATE: 2023   TIME: 3:06 PM

## 2023-12-13 NOTE — DISCHARGE INSTR - AVS FIRST PAGE
.[facility]  Dr. America Recio MD  [address] [facility]  Phone: [phone]     Postoperative Instructions for Outpatient Surgery     These instructions are being provided by your doctor to give you basic guidelines during your post-op recovery. Please let our office know if your contact information has changed. Please call the office today for an appointment in 7-10 days for postoperative care     Dressings: Skin glue. Activity Restrictions: None weight bearing left leg except toe touch. Bathing:  Ok to shower in 24-48 hours. No tub baths. Medications:    Resume pre-op medications. You may take tylenol, aleve, or ibuprofen for pain control             Tylenol #3 as needed for pain. Other: Elevate left leg while at home at rest. Ice at 15 min intervals over the next 48 hours while awake.

## 2023-12-14 ENCOUNTER — TELEPHONE (OUTPATIENT)
Dept: HEMATOLOGY ONCOLOGY | Facility: CLINIC | Age: 63
End: 2023-12-14

## 2023-12-14 NOTE — TELEPHONE ENCOUNTER
Spoke with pts wife. Explained to her that abx are not prescribed unless necessary and pt has no ss of infection. She reports pt is feeling well. Wife requested post-op appt be moved up as they do not want to wait 3 weeks to review pathology. Explained to her that path takes about 2 weeks, but can take longer at times. Offered to move his appt up, but explained to her if path is not back in time, his appt may need to be r/s. She verbalized understanding.

## 2023-12-18 PROCEDURE — 88305 TISSUE EXAM BY PATHOLOGIST: CPT | Performed by: STUDENT IN AN ORGANIZED HEALTH CARE EDUCATION/TRAINING PROGRAM

## 2023-12-20 ENCOUNTER — OFFICE VISIT (OUTPATIENT)
Dept: PLASTIC SURGERY | Facility: CLINIC | Age: 63
End: 2023-12-20

## 2023-12-20 DIAGNOSIS — C43.72 MELANOMA OF LEFT POSTERIOR CALF (HCC): Primary | ICD-10-CM

## 2023-12-20 NOTE — PROGRESS NOTES
POST-OP EVALUATION  12/20/2023    Subjective   Garry Crook is a 63 y.o. male is here today for routine post-operative follow up.  12/13/23 1424         Procedures:      WIDE LOCAL EXCISION OF LEFT CALF MELANOMA (Left: Leg)      RECONSTRUCTION OF LEFT LOWER EXTREMITY WOUND WITH SPLIT THICKNESS SKIN GRAFT WITH WOUND VAC BOLSTER. (Left: Leg Lower)     Pt feels well today.    Past Medical History:   Diagnosis Date    Arthritis     COVID     2022    Hemorrhoids     Melanoma of left posterior calf (HCC)      Past Surgical History:   Procedure Laterality Date    COLONOSCOPY      INGUINAL HERNIA REPAIR Left     JOINT REPLACEMENT      left TKR    SKIN LESION EXCISION Left 12/13/2023    Procedure: WIDE LOCAL EXCISION OF LEFT CALF MELANOMA;  Surgeon: Diana Pena MD;  Location:  MAIN OR;  Service: Surgical Oncology    SPLIT THICKNESS SKIN GRAFT Left 12/13/2023    Procedure: RECONSTRUCTION OF LEFT LOWER EXTREMITY WOUND WITH SPLIT THICKNESS SKIN GRAFT WITH WOUND VAC BOLSTER.;  Surgeon: Gary Flores MD;  Location:  MAIN OR;  Service: Plastics        Current Outpatient Medications:     acetaminophen-codeine (TYLENOL with CODEINE #3) 300-30 MG per tablet, Take 1 tablet by mouth every 4 (four) hours as needed for moderate pain for up to 18 doses, Disp: 18 tablet, Rfl: 0    Ascorbic Acid (VITAMIN C PO), Take by mouth daily, Disp: , Rfl:     GARLIC PO, Take 1 tablet by mouth daily, Disp: , Rfl:     multivitamin (THERAGRAN) TABS, Take 1 tablet by mouth daily, Disp: , Rfl:     NON FORMULARY, Beet root prn, Disp: , Rfl:     Omega-3 Fatty Acids (SALMON OIL PO), Take by mouth daily, Disp: , Rfl:     sildenafil (VIAGRA) 100 mg tablet, TAKE ONE TABLET BY MOUTH ONCE A DAY AS NEEDED, Disp: , Rfl:   Allergies: Penicillins    Objective      Incision is clean, dry, intact.    Pathology  A. Skin, left leg, excision:     -Prior procedure site changes present.     -Residual melanoma not seen.    Assessment/Plan   Diagnoses and all orders  for this visit:    Melanoma of left posterior calf (HCC)    Pt should remain out of work for the next two weeks. He is a  and is on his knees frequently.  The knee pads could jeopardize the incision.  He will RTC in two weeks for re-evaluation.    He will call with any concerns.    Da Nagle PA-C

## 2023-12-27 ENCOUNTER — OFFICE VISIT (OUTPATIENT)
Age: 63
End: 2023-12-27

## 2023-12-27 VITALS
OXYGEN SATURATION: 97 % | HEIGHT: 71 IN | HEART RATE: 88 BPM | TEMPERATURE: 98 F | RESPIRATION RATE: 20 BRPM | WEIGHT: 208 LBS | BODY MASS INDEX: 29.12 KG/M2 | DIASTOLIC BLOOD PRESSURE: 94 MMHG | SYSTOLIC BLOOD PRESSURE: 152 MMHG

## 2023-12-27 DIAGNOSIS — C43.72 MELANOMA OF LEFT POSTERIOR CALF (HCC): Primary | ICD-10-CM

## 2023-12-27 PROCEDURE — 99024 POSTOP FOLLOW-UP VISIT: CPT | Performed by: STUDENT IN AN ORGANIZED HEALTH CARE EDUCATION/TRAINING PROGRAM

## 2023-12-27 NOTE — PROGRESS NOTES
Surgical Oncology Follow Up    [facility] [address] [place] [address]  [phone]    Garry Crook  1960  [id number]    Diagnoses and all orders for this visit:    Melanoma of left posterior calf (HCC)        Chief Complaint   Patient presents with    Follow-up       No follow-ups on file.      Oncology History   Melanoma of left posterior calf (HCC)   10/19/2023 Biopsy    Left upper lateral calf- Malignant Melanoma  Thickness 0.5mm  Less than 1 Mitoses   Ulceration absent      11/9/2023 Initial Diagnosis    Melanoma of left posterior calf (HCC)     12/13/2023 Surgery    A. Skin, left leg, excision:     -Prior procedure site changes present.     -Residual melanoma not seen.         Staging: T1a ruth  Treatment history: WLE 12/2023  Current treatment: postop  Disease status: postop    History of Present Illness: Doing well. Closed by plastics.     Review of Systems  Complete ROS Surg Onc:   Constitutional: The patient denies new or recent history of general fatigue, no recent weight loss, no change in appetite.   Eyes: No complaints of visual problems, no scleral icterus.   ENT: no complaints of ear pain, no hoarseness, no difficulty swallowing,  no tinnitus and no new masses in head, oral cavity, or neck.   Cardiovascular: No complaints of chest pain, no palpitations, no ankle edema.   Respiratory: No complaints of shortness of breath, no cough.   Gastrointestinal: No complaints of jaundice, no bloody stools, no pale stools.   Genitourinary: No complaints of dysuria, no hematuria, no nocturia, no frequent urination, no urethral discharge.   Musculoskeletal: No complaints of weakness, paralysis, joint stiffness or arthralgias.  Integumentary: No complaints of rash, no new lesions.   Neurological: No complaints of convulsions, no seizures, no dizziness.   Hematologic/Lymphatic: No complaints of easy  bruising.   Endocrine:  No hot or cold intolerance.  No polydipsia, polyphagia, or polyuria.  Allergy/immunology:  No environmental allergies.  No food allergies.  Not immunocompromised.  Skin:  No pallor or rash.  No wound.        Patient Active Problem List   Diagnosis    Melanoma of left posterior calf (HCC)     Past Medical History:   Diagnosis Date    Arthritis     COVID     2022    Hemorrhoids     Melanoma of left posterior calf (HCC)      Past Surgical History:   Procedure Laterality Date    COLONOSCOPY      INGUINAL HERNIA REPAIR Left     JOINT REPLACEMENT      left TKR    SKIN LESION EXCISION Left 12/13/2023    Procedure: WIDE LOCAL EXCISION OF LEFT CALF MELANOMA;  Surgeon: Diana Pena MD;  Location:  MAIN OR;  Service: Surgical Oncology    SPLIT THICKNESS SKIN GRAFT Left 12/13/2023    Procedure: RECONSTRUCTION OF LEFT LOWER EXTREMITY WOUND WITH SPLIT THICKNESS SKIN GRAFT WITH WOUND VAC BOLSTER.;  Surgeon: Gary Flores MD;  Location:  MAIN OR;  Service: Plastics     No family history on file.  Social History     Socioeconomic History    Marital status: /Civil Union     Spouse name: Not on file    Number of children: Not on file    Years of education: Not on file    Highest education level: Not on file   Occupational History    Not on file   Tobacco Use    Smoking status: Never    Smokeless tobacco: Never   Substance and Sexual Activity    Alcohol use: Not Currently    Drug use: Not Currently    Sexual activity: Not on file   Other Topics Concern    Not on file   Social History Narrative    Not on file     Social Determinants of Health     Financial Resource Strain: Not on file   Food Insecurity: Not on file   Transportation Needs: Not on file   Physical Activity: Not on file   Stress: Not on file   Social Connections: Not on file   Intimate Partner Violence: Not on file   Housing Stability: Not on file       Current Outpatient Medications:     acetaminophen-codeine (TYLENOL with CODEINE  #3) 300-30 MG per tablet, Take 1 tablet by mouth every 4 (four) hours as needed for moderate pain for up to 18 doses, Disp: 18 tablet, Rfl: 0    Ascorbic Acid (VITAMIN C PO), Take by mouth daily, Disp: , Rfl:     GARLIC PO, Take 1 tablet by mouth daily, Disp: , Rfl:     multivitamin (THERAGRAN) TABS, Take 1 tablet by mouth daily, Disp: , Rfl:     NON FORMULARY, Beet root prn, Disp: , Rfl:     Omega-3 Fatty Acids (SALMON OIL PO), Take by mouth daily (Patient not taking: Reported on 12/27/2023), Disp: , Rfl:     sildenafil (VIAGRA) 100 mg tablet, TAKE ONE TABLET BY MOUTH ONCE A DAY AS NEEDED, Disp: , Rfl:   Allergies   Allergen Reactions    Penicillins Other (See Comments)     unknown    REACTION IS UNKNOWN - OCCURRED IN CHILDHOOD     Vitals:    12/27/23 1507   BP: 152/94   Pulse: 88   Resp: 20   Temp: 98 °F (36.7 °C)   SpO2: 97%       Physical Exam  Constitutional: Patient is well-developed and well-nourished who appears the stated age in no acute distress. Patient is pleasant and talkative.     HEENT:  Normocephalic.  Sclerae are anicteric. Mucous membranes are moist. Neck is supple without adenopathy. No JVD.     Chest: Equal chest rise, easy WOB  Cardiac: Heart is regular rate.     Abdomen: Abdomen is non-tender, non-distended and without masses.     Extremities: There is no clubbing or cyanosis. There is no edema.  Symmetric.  Neuro: Grossly nonfocal. Gait is normal.     Lymphatic: No evidence of cervical adenopathy bilaterally. No evidence of axillary adenopathy bilaterally. No evidence of inguinal adenopathy bilaterally.     Skin: Warm, anicteric.  Incision with irritation but no s/s infection.   Psych:  Patient is pleasant and talkative.    Pathology:    Labs:  Reviewed in [employer] personally    Imaging  XR chest pa & lateral    Result Date: 12/4/2023  Narrative: CHEST INDICATION:   Malignant melanoma of left lower limb, including hip. COMPARISON:  There are no previous examinations available for comparison. EXAM  PERFORMED/VIEWS:  XR CHEST PA & LATERAL Images: 2 FINDINGS: Cardiomediastinal silhouette appears unremarkable. The lungs are clear.  No pneumothorax or pleural effusion. Osseous structures appear within normal limits for patient age.     Impression: No acute cardiopulmonary disease. Electronically signed: 12/04/2023 07:59 PM Mayco Parra MD      I reviewed the above laboratory and imaging data.    Discussion/Summary:   Pt is s/p WLE for melanoma of T1a ruth. Path reviewed. Dicussed surveillance with q3 mo visits with derm for skin checks as well as q6 mo visits with me for repeat PE. Discussed vigilance about new skin lesions in this region or new lumps/bumps in LN basins. Discussed need for continued sun protection with sunscreen, hats, minimizing sun exposure. Patient and family expressed understanding and endorsement.

## 2024-01-02 ENCOUNTER — TELEPHONE (OUTPATIENT)
Dept: HEMATOLOGY ONCOLOGY | Facility: CLINIC | Age: 64
End: 2024-01-02

## 2024-01-02 ENCOUNTER — TELEPHONE (OUTPATIENT)
Age: 64
End: 2024-01-02

## 2024-01-02 NOTE — TELEPHONE ENCOUNTER
Patient Call    Who are you speaking with? Spouse    If it is not the patient, are they listed on an active communication consent form? Yes   What is the reason for this call? Forms will be arriving for claim   Does this require a call back? No   If a call back is required, please list best call back number [phone]    If a call back is required, advise that a message will be forwarded to their care team and someone will return their call as soon as possible.   Did you relay this information to the patient? Yes

## 2024-01-02 NOTE — TELEPHONE ENCOUNTER
Wife called to let us know his disability will be sending some additional papers over to be filled out. I advised I would note it in his chart.

## 2024-01-03 ENCOUNTER — TELEPHONE (OUTPATIENT)
Dept: PLASTIC SURGERY | Facility: CLINIC | Age: 64
End: 2024-01-03

## 2024-01-03 NOTE — TELEPHONE ENCOUNTER
Lvm for pt to r/s appt from 1/3/24. Pt needs to be evaluated in order to go back to work. Please schedule with Da or Karen

## 2024-01-11 ENCOUNTER — OFFICE VISIT (OUTPATIENT)
Dept: PLASTIC SURGERY | Facility: CLINIC | Age: 64
End: 2024-01-11

## 2024-01-11 DIAGNOSIS — C43.72 MELANOMA OF LEFT POSTERIOR CALF (HCC): Primary | ICD-10-CM

## 2024-01-11 PROCEDURE — 99024 POSTOP FOLLOW-UP VISIT: CPT | Performed by: PHYSICIAN ASSISTANT

## 2024-01-11 NOTE — PROGRESS NOTES
POST-OP EVALUATION  1/11/2024    Moises Crook is a 63 y.o. male is here today for routine post-operative follow up.  12/13/23 1428               Procedures:      WIDE LOCAL EXCISION OF LEFT CALF MELANOMA (Left: Leg)      RECONSTRUCTION OF LEFT LOWER EXTREMITY WOUND WITH SPLIT THICKNESS SKIN GRAFT WITH WOUND VAC BOLSTER. (Left: Leg Lower)     Past Medical History:   Diagnosis Date    Arthritis     COVID     2022    Hemorrhoids     Melanoma of left posterior calf (HCC)      Past Surgical History:   Procedure Laterality Date    COLONOSCOPY      INGUINAL HERNIA REPAIR Left     JOINT REPLACEMENT      left TKR    SKIN LESION EXCISION Left 12/13/2023    Procedure: WIDE LOCAL EXCISION OF LEFT CALF MELANOMA;  Surgeon: Diana Pena MD;  Location:  MAIN OR;  Service: Surgical Oncology    SPLIT THICKNESS SKIN GRAFT Left 12/13/2023    Procedure: RECONSTRUCTION OF LEFT LOWER EXTREMITY WOUND WITH SPLIT THICKNESS SKIN GRAFT WITH WOUND VAC BOLSTER.;  Surgeon: Gary Flores MD;  Location:  MAIN OR;  Service: Plastics        Current Outpatient Medications:     acetaminophen-codeine (TYLENOL with CODEINE #3) 300-30 MG per tablet, Take 1 tablet by mouth every 4 (four) hours as needed for moderate pain for up to 18 doses, Disp: 18 tablet, Rfl: 0    Ascorbic Acid (VITAMIN C PO), Take by mouth daily, Disp: , Rfl:     GARLIC PO, Take 1 tablet by mouth daily, Disp: , Rfl:     multivitamin (THERAGRAN) TABS, Take 1 tablet by mouth daily, Disp: , Rfl:     NON FORMULARY, Beet root prn, Disp: , Rfl:     Omega-3 Fatty Acids (SALMON OIL PO), Take by mouth daily (Patient not taking: Reported on 12/27/2023), Disp: , Rfl:     sildenafil (VIAGRA) 100 mg tablet, TAKE ONE TABLET BY MOUTH ONCE A DAY AS NEEDED, Disp: , Rfl:   Allergies: Penicillins    Objective      Incision is dry, closed.  Periwound erythema. Scab.     Assessment/Plan   Diagnoses and all orders for this visit:    Melanoma of left posterior calf (HCC)    OK to return  to work Monday 1/15/2024  Aquaphor to incision.  Kerlix and ACE while at work.  Followup as needed.  Call with any concern.    Da Nagel PA-C

## 2024-01-12 ENCOUNTER — TELEPHONE (OUTPATIENT)
Age: 64
End: 2024-01-12

## 2024-01-12 ENCOUNTER — TELEPHONE (OUTPATIENT)
Dept: HEMATOLOGY ONCOLOGY | Facility: CLINIC | Age: 64
End: 2024-01-12

## 2024-01-12 NOTE — TELEPHONE ENCOUNTER
Received call from patient asking for Aslhey to call her back to confirm if she received the patient work status sheet.

## 2024-01-12 NOTE — TELEPHONE ENCOUNTER
Appointment Change  Cancel, Reschedule, Change to Virtual      Who are you speaking with? Spouse Betsy   If it is not the patient, is the caller listed on the communication consent form? Yes   Which provider is the appointment scheduled with? IVÁN Stein   When was the original appointment scheduled?    Please list date and time 6/25/24 2:30pm   At which location is the appointment scheduled to take place? [place]   Was the appointment rescheduled?     Was the appointment changed from an in person visit to a virtual visit?    If so, please list the details of the change. Betsy stated that patient will be following up with derm and does not need to be seen with surgical oncology.     What is the reason for the appointment change? Patient is following up with derm       Was STAR transport scheduled? No   Does STAR transport need to be scheduled for the new visit (if applicable) No   Does the patient need an infusion appointment rescheduled? No   Does the patient have an upcoming infusion appointment scheduled? If so, when? No   Is the patient undergoing chemotherapy? No   For appointments cancelled with less than 24 hours:  Was the no-show policy reviewed? Yes

## 2024-01-15 ENCOUNTER — TELEPHONE (OUTPATIENT)
Dept: SURGICAL ONCOLOGY | Facility: CLINIC | Age: 64
End: 2024-01-15

## 2024-01-15 NOTE — TELEPHONE ENCOUNTER
I believe the message was it was cancelled with Joanie and can be scheduled with Dr Pena that is why I rescheduled it that way.

## 2024-06-25 PROBLEM — Z85.820 PERSONAL HISTORY OF MALIGNANT MELANOMA: Status: ACTIVE | Noted: 2023-11-09

## 2024-06-25 PROBLEM — Z08 ENCOUNTER FOR FOLLOW-UP SURVEILLANCE OF MELANOMA: Status: ACTIVE | Noted: 2024-06-25

## 2024-06-25 PROBLEM — Z85.820 ENCOUNTER FOR FOLLOW-UP SURVEILLANCE OF MELANOMA: Status: ACTIVE | Noted: 2024-06-25

## 2024-09-05 ENCOUNTER — TELEPHONE (OUTPATIENT)
Dept: SURGICAL ONCOLOGY | Facility: CLINIC | Age: 64
End: 2024-09-05

## 2024-09-05 NOTE — TELEPHONE ENCOUNTER
Called patients spouse back and left a message stating that Dr. Pena had advised surveillance every 6 months and would like for Garry to come in since he is overdue for the follow up. I left the [facility] number to call back so that we can get him scheduled. I also stated that he can make his appointment with Dr. Pena or he can see IVÁN Stein if her schedule will work better for them.

## 2024-09-06 ENCOUNTER — TELEPHONE (OUTPATIENT)
Dept: SURGICAL ONCOLOGY | Facility: CLINIC | Age: 64
End: 2024-09-06

## 2024-09-06 NOTE — TELEPHONE ENCOUNTER
Called patient and spoke to Garry, we were able to get him scheduled for his follow up visit with Dr. Pena. I confirmed the date, time and location with Garry. He was thankful for the help.

## 2024-09-25 ENCOUNTER — OFFICE VISIT (OUTPATIENT)
Age: 64
End: 2024-09-25
Payer: MEDICARE

## 2024-09-25 VITALS
HEIGHT: 71 IN | WEIGHT: 193.4 LBS | HEART RATE: 82 BPM | SYSTOLIC BLOOD PRESSURE: 148 MMHG | OXYGEN SATURATION: 98 % | BODY MASS INDEX: 27.07 KG/M2 | RESPIRATION RATE: 18 BRPM | DIASTOLIC BLOOD PRESSURE: 86 MMHG | TEMPERATURE: 97.8 F

## 2024-09-25 DIAGNOSIS — Z85.820 ENCOUNTER FOR FOLLOW-UP SURVEILLANCE OF MELANOMA: Primary | ICD-10-CM

## 2024-09-25 DIAGNOSIS — Z08 ENCOUNTER FOR FOLLOW-UP SURVEILLANCE OF MELANOMA: Primary | ICD-10-CM

## 2024-09-25 DIAGNOSIS — Z85.820 PERSONAL HISTORY OF MALIGNANT MELANOMA: ICD-10-CM

## 2024-09-25 DIAGNOSIS — R91.1 PULMONARY NODULE: ICD-10-CM

## 2024-09-25 PROCEDURE — 99214 OFFICE O/P EST MOD 30 MIN: CPT | Performed by: STUDENT IN AN ORGANIZED HEALTH CARE EDUCATION/TRAINING PROGRAM

## 2024-09-25 RX ORDER — MAGNESIUM 30 MG
30 TABLET ORAL 2 TIMES DAILY
COMMUNITY

## 2024-09-25 NOTE — ASSESSMENT & PLAN NOTE
Dicussed surveillance with q3 mo visits with derm for skin checks as well as q6 mo visits with me for repeat PE. Discussed vigilance about new skin lesions in this region or new lumps/bumps in LN basins. Discussed need for continued sun protection with sunscreen, hats, minimizing sun exposure. Patient and family expressed understanding and endorsement. He has been doing this.

## 2024-09-25 NOTE — PROGRESS NOTES
Surgical Oncology Follow Up    [facility] [address] [place] [id number] [phone]    Garry [place]  1960  [id number]      ASSESSMENT AND PLAN    1. Encounter for follow-up surveillance of melanoma  2. Personal history of malignant melanoma  Assessment & Plan:  Dicussed surveillance with q3 mo visits with derm for skin checks as well as q6 mo visits with me for repeat PE. Discussed vigilance about new skin lesions in this region or new lumps/bumps in LN basins. Discussed need for continued sun protection with sunscreen, hats, minimizing sun exposure. Patient and family expressed understanding and endorsement. He has been doing this.           3. Pulmonary nodule  Assessment & Plan:  Reassured that pulm nodules are unlikely to be related to hx of early stage ruth and he should f/u with PCP for f/u imaging        Oncology History   Personal history of malignant melanoma   10/19/2023 Biopsy    Left upper lateral calf- Malignant Melanoma  Thickness 0.5mm  Less than 1 Mitoses   Ulceration absent      11/9/2023 Initial Diagnosis    Melanoma of left posterior calf (HCC)     12/13/2023 Surgery    A. Skin, left leg, excision:     -Prior procedure site changes present.     -Residual melanoma not seen.     12/27/2023 -  Cancer Staged    Staging form: Melanoma of the Skin, AJCC 8th Edition  - Pathologic: Stage Unknown (pT1a, pNX, cM0) - Signed by Diana Pena MD on 12/27/2023           Staging: T1a ruth  Treatment history: WLE 12/2023  Current treatment: DELILAH  Disease status: obs    History of Present Illness: Doing well. Recently had chest CT per PCP for hx pulm nodules? Cannot view hx of this. They are concerned about several small (6 mm or less) nodules present. Has been seeing derm every 3 mo. Recently biopsied a few things on the back which are pending. No systemic sx. Left calf healing well.     Review  of Systems  Complete ROS Surg Onc:   Constitutional: The patient denies new or recent history of general fatigue, no recent weight loss, no change in appetite.   Eyes: No complaints of visual problems, no scleral icterus.   ENT: no complaints of ear pain, no hoarseness, no difficulty swallowing,  no tinnitus and no new masses in head, oral cavity, or neck.   Cardiovascular: No complaints of chest pain, no palpitations, no ankle edema.   Respiratory: No complaints of shortness of breath, no cough.   Gastrointestinal: No complaints of jaundice, no bloody stools, no pale stools.   Genitourinary: No complaints of dysuria, no hematuria, no nocturia, no frequent urination, no urethral discharge.   Musculoskeletal: No complaints of weakness, paralysis, joint stiffness or arthralgias.  Integumentary: No complaints of rash, no new lesions.   Neurological: No complaints of convulsions, no seizures, no dizziness.   Hematologic/Lymphatic: No complaints of easy bruising.   Endocrine:  No hot or cold intolerance.  No polydipsia, polyphagia, or polyuria.  Allergy/immunology:  No environmental allergies.  No food allergies.  Not immunocompromised.  Skin:  No pallor or rash.  No wound.        Patient Active Problem List   Diagnosis    Personal history of malignant melanoma    Encounter for follow-up surveillance of melanoma    Pulmonary nodule     Past Medical History:   Diagnosis Date    Arthritis     COVID     2022    Hemorrhoids     Melanoma of left posterior calf (HCC)      Past Surgical History:   Procedure Laterality Date    COLONOSCOPY      INGUINAL HERNIA REPAIR Left     JOINT REPLACEMENT      left TKR    SKIN LESION EXCISION Left 12/13/2023    Procedure: WIDE LOCAL EXCISION OF LEFT CALF MELANOMA;  Surgeon: Diana Pena MD;  Location:  MAIN OR;  Service: Surgical Oncology    SPLIT THICKNESS SKIN GRAFT Left 12/13/2023    Procedure: RECONSTRUCTION OF LEFT LOWER EXTREMITY WOUND WITH SPLIT THICKNESS SKIN GRAFT WITH WOUND  DONNY MITCHELL.;  Surgeon: Gary Flores MD;  Location:  MAIN OR;  Service: Plastics     No family history on file.  Social History     Socioeconomic History    Marital status: /Civil Union     Spouse name: Not on file    Number of children: Not on file    Years of education: Not on file    Highest education level: Not on file   Occupational History    Not on file   Tobacco Use    Smoking status: Never    Smokeless tobacco: Never   Substance and Sexual Activity    Alcohol use: Not Currently    Drug use: Not Currently    Sexual activity: Not on file   Other Topics Concern    Not on file   Social History Narrative    Not on file     Social Determinants of Health     Financial Resource Strain: Not on file   Food Insecurity: Not on file   Transportation Needs: Not on file   Physical Activity: Not on file   Stress: Not on file   Social Connections: Not on file   Intimate Partner Violence: Not on file   Housing Stability: Not on file       Current Outpatient Medications:     Ascorbic Acid (VITAMIN C PO), Take by mouth daily, Disp: , Rfl:     GARLIC PO, Take 1 tablet by mouth daily, Disp: , Rfl:     magnesium 30 MG tablet, Take 30 mg by mouth 2 (two) times a day, Disp: , Rfl:     multivitamin (THERAGRAN) TABS, Take 1 tablet by mouth daily, Disp: , Rfl:     Omega-3 Fatty Acids (SALMON OIL PO), Take by mouth daily, Disp: , Rfl:     sildenafil (VIAGRA) 100 mg tablet, TAKE ONE TABLET BY MOUTH ONCE A DAY AS NEEDED, Disp: , Rfl:     acetaminophen-codeine (TYLENOL with CODEINE #3) 300-30 MG per tablet, Take 1 tablet by mouth every 4 (four) hours as needed for moderate pain for up to 18 doses, Disp: 18 tablet, Rfl: 0    NON FORMULARY, Beet root prn (Patient not taking: Reported on 9/25/2024), Disp: , Rfl:   Allergies   Allergen Reactions    Penicillins Other (See Comments)     unknown    REACTION IS UNKNOWN - OCCURRED IN CHILDHOOD     Vitals:    09/25/24 1451   BP: 148/86   Pulse: 82   Resp: 18   Temp: 97.8 °F (36.6 °C)    SpO2: 98%       Physical Exam  Constitutional: Patient is well-developed and well-nourished who appears the stated age in no acute distress. Patient is pleasant and talkative.     HEENT:  Normocephalic.  Sclerae are anicteric. Mucous membranes are moist. Neck is supple without adenopathy. No JVD.     Chest: Equal chest rise, easy WOB  Cardiac: Heart is regular rate.     Abdomen: Abdomen is non-tender, non-distended and without masses.     Extremities: There is no clubbing or cyanosis. There is no edema.  Symmetric.  Neuro: Grossly nonfocal. Gait is normal.     Lymphatic: No evidence of cervical adenopathy bilaterally. No evidence of axillary adenopathy bilaterally. No evidence of inguinal adenopathy bilaterally.     Skin: Warm, anicteric.  Incision healing well  Psych:  Patient is pleasant and talkative.      Labs:  Reviewed in [employer] personally    Imaging  XR chest pa & lateral    Result Date: 12/4/2023  Narrative: CHEST INDICATION:   Malignant melanoma of left lower limb, including hip. COMPARISON:  There are no previous examinations available for comparison. EXAM PERFORMED/VIEWS:  XR CHEST PA & LATERAL Images: 2 FINDINGS: Cardiomediastinal silhouette appears unremarkable. The lungs are clear.  No pneumothorax or pleural effusion. Osseous structures appear within normal limits for patient age.     Impression: No acute cardiopulmonary disease. Electronically signed: 12/04/2023 07:59 PM Mayco Parra MD      I reviewed the available laboratory and imaging data incl [facility] for ouside reports, CE, CT chest, FM notes, path, op notes, plastics note

## 2024-09-25 NOTE — ASSESSMENT & PLAN NOTE
Reassured that pulm nodules are unlikely to be related to hx of early stage ruth and he should f/u with PCP for f/u imaging

## (undated) DEVICE — INTENDED FOR TISSUE SEPARATION, AND OTHER PROCEDURES THAT REQUIRE A SHARP SURGICAL BLADE TO PUNCTURE OR CUT.: Brand: BARD-PARKER SAFETY BLADES SIZE 15, STERILE

## (undated) DEVICE — SYRINGE 10ML LL

## (undated) DEVICE — SUT SILK 2-0 SH 30 IN K833H

## (undated) DEVICE — BETHLEHEM UNIVERSAL OUTPATIENT: Brand: CARDINAL HEALTH

## (undated) DEVICE — DERMATOME BLADES: Brand: DERMATOME

## (undated) DEVICE — PROXIMATE SKIN STAPLERS (35 WIDE) CONTAINS 35 STAINLESS STEEL STAPLES (FIXED HEAD): Brand: PROXIMATE

## (undated) DEVICE — TUBING SUCTION 5MM X 12 FT

## (undated) DEVICE — NEEDLE 25G X 1 1/2

## (undated) DEVICE — GAUZE SPONGES,16 PLY: Brand: CURITY

## (undated) DEVICE — NEEDLE 23G X 1 1/2 SAFETY-GLIDE THIN WALL

## (undated) DEVICE — PLUMEPEN PRO 10FT

## (undated) DEVICE — BETHLEHEM UNIVERSAL MINOR GEN: Brand: CARDINAL HEALTH

## (undated) DEVICE — SUT VICRYL 2-0 SH 27 IN UNDYED J417H

## (undated) DEVICE — ELECTRODE BLADE MOD E-Z CLEAN 2.5IN 6.4CM -0012M

## (undated) DEVICE — WET SKIN PREP TRAY: Brand: MEDLINE INDUSTRIES, INC.

## (undated) DEVICE — ELECTRODE NEEDLE MOD E-Z CLEAN 2.75IN 7CM -0013M

## (undated) DEVICE — GLOVE SRG BIOGEL 6.5

## (undated) DEVICE — SUT STRATAFIX SPIRAL MONOCRYL PLUS 3-0 PS-2 45CM SXMP1B107